# Patient Record
Sex: FEMALE | Race: BLACK OR AFRICAN AMERICAN | NOT HISPANIC OR LATINO | Employment: OTHER | URBAN - METROPOLITAN AREA
[De-identification: names, ages, dates, MRNs, and addresses within clinical notes are randomized per-mention and may not be internally consistent; named-entity substitution may affect disease eponyms.]

---

## 2023-12-18 ENCOUNTER — HOSPITAL ENCOUNTER (EMERGENCY)
Facility: HOSPITAL | Age: 78
Discharge: ANOTHER HOSPITAL | End: 2023-12-18
Attending: FAMILY MEDICINE

## 2023-12-18 VITALS
SYSTOLIC BLOOD PRESSURE: 145 MMHG | BODY MASS INDEX: 21.35 KG/M2 | TEMPERATURE: 98 F | HEIGHT: 62 IN | WEIGHT: 116 LBS | DIASTOLIC BLOOD PRESSURE: 88 MMHG | RESPIRATION RATE: 18 BRPM | HEART RATE: 96 BPM | OXYGEN SATURATION: 98 %

## 2023-12-18 DIAGNOSIS — R07.9 CHEST PAIN: ICD-10-CM

## 2023-12-18 DIAGNOSIS — I21.4 NSTEMI (NON-ST ELEVATED MYOCARDIAL INFARCTION): Primary | ICD-10-CM

## 2023-12-18 LAB
ALBUMIN SERPL-MCNC: 4 G/DL (ref 3.4–4.8)
ALBUMIN/GLOB SERPL: 0.9 RATIO (ref 1.1–2)
ALP SERPL-CCNC: 78 UNIT/L (ref 40–150)
ALT SERPL-CCNC: 17 UNIT/L (ref 0–55)
APPEARANCE UR: CLEAR
AST SERPL-CCNC: 31 UNIT/L (ref 5–34)
BACTERIA #/AREA URNS AUTO: ABNORMAL /HPF
BASOPHILS # BLD AUTO: 0.01 X10(3)/MCL
BASOPHILS NFR BLD AUTO: 0.1 %
BILIRUB SERPL-MCNC: 1.2 MG/DL
BILIRUB UR QL STRIP.AUTO: NEGATIVE
BUN SERPL-MCNC: 10.4 MG/DL (ref 9.8–20.1)
CALCIUM SERPL-MCNC: 9.3 MG/DL (ref 8.4–10.2)
CHLORIDE SERPL-SCNC: 104 MMOL/L (ref 98–107)
CO2 SERPL-SCNC: 20 MMOL/L (ref 23–31)
COLOR UR AUTO: YELLOW
CREAT SERPL-MCNC: 1.07 MG/DL (ref 0.55–1.02)
EOSINOPHIL # BLD AUTO: 0.02 X10(3)/MCL (ref 0–0.9)
EOSINOPHIL NFR BLD AUTO: 0.3 %
ERYTHROCYTE [DISTWIDTH] IN BLOOD BY AUTOMATED COUNT: 13.5 % (ref 11.5–17)
FLUAV AG UPPER RESP QL IA.RAPID: NOT DETECTED
FLUBV AG UPPER RESP QL IA.RAPID: NOT DETECTED
GFR SERPLBLD CREATININE-BSD FMLA CKD-EPI: 53 MLS/MIN/1.73/M2
GLOBULIN SER-MCNC: 4.5 GM/DL (ref 2.4–3.5)
GLUCOSE SERPL-MCNC: 157 MG/DL (ref 82–115)
GLUCOSE UR QL STRIP.AUTO: NEGATIVE
HCT VFR BLD AUTO: 45.3 % (ref 37–47)
HGB BLD-MCNC: 14.1 G/DL (ref 12–16)
IMM GRANULOCYTES # BLD AUTO: 0.01 X10(3)/MCL (ref 0–0.04)
IMM GRANULOCYTES NFR BLD AUTO: 0.1 %
KETONES UR QL STRIP.AUTO: NEGATIVE
LEUKOCYTE ESTERASE UR QL STRIP.AUTO: ABNORMAL
LYMPHOCYTES # BLD AUTO: 1.3 X10(3)/MCL (ref 0.6–4.6)
LYMPHOCYTES NFR BLD AUTO: 17.9 %
MCH RBC QN AUTO: 26.1 PG (ref 27–31)
MCHC RBC AUTO-ENTMCNC: 31.1 G/DL (ref 33–36)
MCV RBC AUTO: 83.7 FL (ref 80–94)
MONOCYTES # BLD AUTO: 0.67 X10(3)/MCL (ref 0.1–1.3)
MONOCYTES NFR BLD AUTO: 9.2 %
NEUTROPHILS # BLD AUTO: 5.25 X10(3)/MCL (ref 2.1–9.2)
NEUTROPHILS NFR BLD AUTO: 72.4 %
NITRITE UR QL STRIP.AUTO: NEGATIVE
PH UR STRIP.AUTO: 5.5 [PH]
PLATELET # BLD AUTO: 127 X10(3)/MCL (ref 130–400)
PMV BLD AUTO: 11.5 FL (ref 7.4–10.4)
POC CARDIAC TROPONIN I: 0.46 NG/ML (ref 0–0.08)
POC CARDIAC TROPONIN I: 0.84 NG/ML (ref 0–0.08)
POTASSIUM SERPL-SCNC: 3.5 MMOL/L (ref 3.5–5.1)
PROT SERPL-MCNC: 8.5 GM/DL (ref 5.8–7.6)
PROT UR QL STRIP.AUTO: 100
RBC # BLD AUTO: 5.41 X10(6)/MCL (ref 4.2–5.4)
RBC #/AREA URNS AUTO: ABNORMAL /HPF
RBC UR QL AUTO: ABNORMAL
RSV A 5' UTR RNA NPH QL NAA+PROBE: NOT DETECTED
SAMPLE: ABNORMAL
SAMPLE: ABNORMAL
SARS-COV-2 RNA RESP QL NAA+PROBE: NOT DETECTED
SODIUM SERPL-SCNC: 139 MMOL/L (ref 136–145)
SP GR UR STRIP.AUTO: 1.02 (ref 1–1.03)
SQUAMOUS #/AREA URNS AUTO: ABNORMAL /HPF
UROBILINOGEN UR STRIP-ACNC: 0.2
WBC # SPEC AUTO: 7.26 X10(3)/MCL (ref 4.5–11.5)
WBC #/AREA URNS AUTO: ABNORMAL /HPF

## 2023-12-18 PROCEDURE — 93005 ELECTROCARDIOGRAM TRACING: CPT

## 2023-12-18 PROCEDURE — 0241U COVID/RSV/FLU A&B PCR: CPT | Performed by: FAMILY MEDICINE

## 2023-12-18 PROCEDURE — 80053 COMPREHEN METABOLIC PANEL: CPT | Performed by: FAMILY MEDICINE

## 2023-12-18 PROCEDURE — 81001 URINALYSIS AUTO W/SCOPE: CPT | Performed by: FAMILY MEDICINE

## 2023-12-18 PROCEDURE — 96372 THER/PROPH/DIAG INJ SC/IM: CPT | Mod: 59 | Performed by: FAMILY MEDICINE

## 2023-12-18 PROCEDURE — 85025 COMPLETE CBC W/AUTO DIFF WBC: CPT | Performed by: FAMILY MEDICINE

## 2023-12-18 PROCEDURE — 94761 N-INVAS EAR/PLS OXIMETRY MLT: CPT

## 2023-12-18 PROCEDURE — 99285 EMERGENCY DEPT VISIT HI MDM: CPT | Mod: 25

## 2023-12-18 PROCEDURE — 25000242 PHARM REV CODE 250 ALT 637 W/ HCPCS: Performed by: FAMILY MEDICINE

## 2023-12-18 PROCEDURE — 94640 AIRWAY INHALATION TREATMENT: CPT

## 2023-12-18 PROCEDURE — 96375 TX/PRO/DX INJ NEW DRUG ADDON: CPT

## 2023-12-18 PROCEDURE — 63600175 PHARM REV CODE 636 W HCPCS: Performed by: FAMILY MEDICINE

## 2023-12-18 PROCEDURE — 25000003 PHARM REV CODE 250: Performed by: FAMILY MEDICINE

## 2023-12-18 RX ORDER — LABETALOL HCL 20 MG/4 ML
20 SYRINGE (ML) INTRAVENOUS EVERY 4 HOURS PRN
Status: DISCONTINUED | OUTPATIENT
Start: 2023-12-18 | End: 2023-12-18 | Stop reason: HOSPADM

## 2023-12-18 RX ORDER — IPRATROPIUM BROMIDE AND ALBUTEROL SULFATE 2.5; .5 MG/3ML; MG/3ML
3 SOLUTION RESPIRATORY (INHALATION)
Status: COMPLETED | OUTPATIENT
Start: 2023-12-18 | End: 2023-12-18

## 2023-12-18 RX ORDER — METOPROLOL TARTRATE 25 MG/1
25 TABLET, FILM COATED ORAL 2 TIMES DAILY
Status: DISCONTINUED | OUTPATIENT
Start: 2023-12-18 | End: 2023-12-18 | Stop reason: HOSPADM

## 2023-12-18 RX ORDER — FUROSEMIDE 10 MG/ML
40 INJECTION INTRAMUSCULAR; INTRAVENOUS
Status: COMPLETED | OUTPATIENT
Start: 2023-12-18 | End: 2023-12-18

## 2023-12-18 RX ORDER — ENOXAPARIN SODIUM 100 MG/ML
50 INJECTION SUBCUTANEOUS
Status: COMPLETED | OUTPATIENT
Start: 2023-12-18 | End: 2023-12-18

## 2023-12-18 RX ORDER — HYDRALAZINE HYDROCHLORIDE 20 MG/ML
20 INJECTION INTRAMUSCULAR; INTRAVENOUS EVERY 4 HOURS PRN
Status: DISCONTINUED | OUTPATIENT
Start: 2023-12-18 | End: 2023-12-18 | Stop reason: HOSPADM

## 2023-12-18 RX ADMIN — METHYLPREDNISOLONE SODIUM SUCCINATE 120 MG: 40 INJECTION, POWDER, FOR SOLUTION INTRAMUSCULAR; INTRAVENOUS at 11:12

## 2023-12-18 RX ADMIN — IPRATROPIUM BROMIDE AND ALBUTEROL SULFATE 3 ML: .5; 3 SOLUTION RESPIRATORY (INHALATION) at 11:12

## 2023-12-18 RX ADMIN — CLONIDINE HYDROCHLORIDE 0.3 MG: 0.2 TABLET ORAL at 11:12

## 2023-12-18 RX ADMIN — ENOXAPARIN SODIUM 50 MG: 60 INJECTION SUBCUTANEOUS at 02:12

## 2023-12-18 RX ADMIN — FUROSEMIDE 40 MG: 10 INJECTION, SOLUTION INTRAMUSCULAR; INTRAVENOUS at 11:12

## 2023-12-18 NOTE — ED PROVIDER NOTES
Encounter Date: 12/18/2023       History     Chief Complaint   Patient presents with    Cough     Cough with SOB today      Cough with shortness of breath   78-year-old female patient comes in with cough shortness of breath patient's blood pressure is elevated and she does not take any medicines otherwise patient has no nausea no vomiting no diarrhea no fever chills or night sweats no other complaints at this time patient and her caretaker of the history source        Review of patient's allergies indicates:  No Known Allergies  No past medical history on file.  No past surgical history on file.  No family history on file.     Review of Systems   Constitutional:  Negative for fever.   HENT:  Negative for sore throat.    Respiratory:  Positive for cough and shortness of breath.    Cardiovascular:  Negative for chest pain.   Gastrointestinal:  Negative for nausea.   Genitourinary:  Negative for dysuria.   Musculoskeletal:  Negative for back pain.   Skin:  Negative for rash.   Neurological:  Negative for weakness.   Hematological:  Does not bruise/bleed easily.   All other systems reviewed and are negative.      Physical Exam     Initial Vitals   BP Pulse Resp Temp SpO2   12/18/23 1053 12/18/23 1047 12/18/23 1047 12/18/23 1047 12/18/23 1047   (!) 190/116 (!) 122 20 98.4 °F (36.9 °C) 95 %      MAP       --                Physical Exam    Nursing note and vitals reviewed.  Constitutional: She appears well-developed.   HENT:   Head: Normocephalic and atraumatic.   Right Ear: External ear normal.   Left Ear: External ear normal.   Nose: Nose normal.   Mouth/Throat: Oropharynx is clear and moist. No oropharyngeal exudate.   Eyes: Conjunctivae and EOM are normal. Pupils are equal, round, and reactive to light. Right eye exhibits no discharge. Left eye exhibits no discharge.   Neck: Neck supple. No tracheal deviation present. No JVD present.   Normal range of motion.  Cardiovascular:  Normal rate, regular rhythm, normal heart  sounds and intact distal pulses.     Exam reveals no gallop and no friction rub.       No murmur heard.  Pulmonary/Chest: No stridor. She is in respiratory distress. She has wheezes. She has no rhonchi. She has no rales.   Abdominal: Abdomen is soft. Bowel sounds are normal. She exhibits no distension and no mass. There is no abdominal tenderness. There is no rebound and no guarding.   Musculoskeletal:         General: Normal range of motion.      Cervical back: Normal range of motion and neck supple.     Neurological: She is alert and oriented to person, place, and time. She has normal strength. No cranial nerve deficit.   Skin: Skin is warm and dry. No rash and no abscess noted. No erythema.   Psychiatric: She has a normal mood and affect. Her behavior is normal. Judgment and thought content normal.         ED Course   Procedures  Labs Reviewed   COMPREHENSIVE METABOLIC PANEL - Abnormal; Notable for the following components:       Result Value    Carbon Dioxide 20 (*)     Glucose Level 157 (*)     Creatinine 1.07 (*)     Protein Total 8.5 (*)     Globulin 4.5 (*)     Albumin/Globulin Ratio 0.9 (*)     All other components within normal limits   URINALYSIS, REFLEX TO URINE CULTURE - Abnormal; Notable for the following components:    Protein,  (*)     Blood, UA Moderate (*)     Leukocyte Esterase, UA Small (*)     All other components within normal limits   CBC WITH DIFFERENTIAL - Abnormal; Notable for the following components:    RBC 5.41 (*)     MCH 26.1 (*)     MCHC 31.1 (*)     Platelet 127 (*)     MPV 11.5 (*)     All other components within normal limits   TROPONIN ISTAT - Abnormal; Notable for the following components:    POC Cardiac Troponin I 0.46 (*)     All other components within normal limits   URINALYSIS, MICROSCOPIC - Abnormal; Notable for the following components:    RBC, UA 6-10 (*)     All other components within normal limits   TROPONIN ISTAT - Abnormal; Notable for the following components:     POC Cardiac Troponin I 0.84 (*)     All other components within normal limits   COVID/RSV/FLU A&B PCR - Normal    Narrative:     The Xpert Xpress SARS-CoV-2/FLU/RSV plus is a rapid, multiplexed real-time PCR test intended for the simultaneous qualitative detection and differentiation of SARS-CoV-2, Influenza A, Influenza B, and respiratory syncytial virus (RSV) viral RNA in either nasopharyngeal swab or nasal swab specimens.         CBC W/ AUTO DIFFERENTIAL    Narrative:     The following orders were created for panel order CBC auto differential.  Procedure                               Abnormality         Status                     ---------                               -----------         ------                     CBC with Differential[[id number]]       Abnormal            Final result                 Please view results for these tests on the individual orders.   POCT TROPONIN     EKG Readings: (Independently Interpreted)   Rhythm: Sinus Tachycardia. Heart Rate: 122. Ectopy: No Ectopy. Conduction: Normal. ST Segments: Normal ST Segments. T Waves: Normal. Clinical Impression: Normal Sinus Rhythm       Imaging Results              X-Ray Chest AP Portable (Final result)  Result time 12/18/23 11:54:52      Final result by Ian Rasmussen MD (12/18/23 11:54:52)                   Impression:      No acute chest disease is identified.      Electronically signed by: Ian Rasmussen  Date:    12/18/2023  Time:    11:54               Narrative:    EXAMINATION:  XR CHEST AP PORTABLE    CLINICAL HISTORY:  Chest Pain;, .    FINDINGS:  No alveolar consolidation, effusion, or pneumothorax is seen.   The thoracic aorta is normal  cardiac silhouette, central pulmonary vessels and mediastinum are normal in size and are grossly unremarkable.   visualized osseous structures are grossly unremarkable.                                       Medications   enoxaparin injection 50 mg (has no administration in time range)    furosemide injection 40 mg (40 mg Intravenous Given 12/18/23 1120)   methylPREDNISolone sodium succinate injection 120 mg (120 mg Intravenous Given 12/18/23 1120)   albuterol-ipratropium 2.5 mg-0.5 mg/3 mL nebulizer solution 3 mL (3 mLs Nebulization Given 12/18/23 1122)   cloNIDine tablet 0.3 mg (0.3 mg Oral Given 12/18/23 1156)     Medical Decision Making  MI NSTEMI angina tachycardia hypertension    Amount and/or Complexity of Data Reviewed  Labs: ordered.  Radiology: ordered.    Risk  Prescription drug management.                                      Clinical Impression:  Final diagnoses:  [R07.9] Chest pain  [I21.4] NSTEMI (non-ST elevated myocardial infarction) (Primary)          ED Disposition Condition    Transfer to Another Facility Stable                Gera Jimenez MD  12/18/23 1340       Gera Jimenez MD  12/18/23 1624

## 2023-12-18 NOTE — ED NOTES
Dakota Branch with transfer center; pt accepted by Dr. Schreiber at [facility] - going ED to ED. She is setting up transport with [facility] now.

## 2023-12-18 NOTE — CONSULTS
[facility]  Cardiology  Consult Note    Patient Name: Jennifer Arriola  MRN: [id number]  Admission Date: 12/18/2023  Hospital Length of Stay: 0 days  Code Status: No Order   Attending Provider: Gera Jimenez MD   Consulting Provider: Edd Weston NP  Primary Care Physician: No primary care provider on file.  Principal Problem:<principal problem not specified>    Patient information was obtained from patient, ER records, and primary team.     Consults  Subjective:     Chief Complaint:    Telecardiology Consult  Location: [facility] ER  Provider: Dr Jimenez  Reason: NSTEMI  Duration: > 30 minutes including review of medical records, provider and patient interview    HPI:   79 yo female with history of remote HTN - quit meds a few years ago.    She presents to the emergency room today after she woke up this morning and had acute onset of shortness of breath.  She states she could not catch her breath and she had a feeling of shortness of breath up to her neck.  She presented to the ER for an evaluation.  Upon arrival blood pressure done was close to 200 systolic.  She had an EKG done that showed sinus tachycardia without any acute ST segment changes or elevation.  Her initial troponin was 0.46 a 2 hour repeat was 0.84.  Currently her blood pressure is much better controlled after some clonidine and Lasix.    Upon interview she states she has gotten significantly short of breath when she walks to her mailbox to check her mail.  She does have 2 sisters with a history of coronary artery disease.    She denies any overt chest discomfort.    SBP close to 200 on admit  EKG, ST, no acute changes, No JERI  Trop initial was 0.46, then bumped to 0.84    No past medical history on file.    No past surgical history on file.    Review of patient's allergies indicates:  No Known Allergies    No current facility-administered medications on file prior to encounter.     No current outpatient medications on file  prior to encounter.     Family History    None       Tobacco Use    Smoking status: Not on file    Smokeless tobacco: Not on file   Substance and Sexual Activity    Alcohol use: Not on file    Drug use: Not on file    Sexual activity: Not on file     Review of Systems   Constitutional: Negative.   HENT: Negative.     Eyes: Negative.    Cardiovascular:  Positive for dyspnea on exertion. Negative for chest pain, near-syncope, palpitations and syncope.   Respiratory:  Positive for cough and shortness of breath. Negative for wheezing.    Skin: Negative.    Musculoskeletal: Negative.    Gastrointestinal: Negative.    Neurological: Negative.    Psychiatric/Behavioral: Negative.       Objective:     Vital Signs (Most Recent):  Temp: 98.4 °F (36.9 °C) (12/18/23 1047)  Pulse: 105 (12/18/23 1345)  Resp: 18 (12/18/23 1345)  BP: 129/88 (12/18/23 1345)  SpO2: 98 % (12/18/23 1345) Vital Signs (24h Range):  Temp:  [98.4 °F (36.9 °C)] 98.4 °F (36.9 °C)  Pulse:  [105-122] 105  Resp:  [18-22] 18  SpO2:  [94 %-98 %] 98 %  BP: (112-196)/() 129/88     Weight: 52.6 kg (116 lb)  Body mass index is 21.22 kg/m².    SpO2: 98 %         Intake/Output Summary (Last 24 hours) at 12/18/2023 1414  Last data filed at 12/18/2023 1330  Gross per 24 hour   Intake --   Output 250 ml   Net -250 ml       Lines/Drains/Airways       Peripheral Intravenous Line  Duration                  Peripheral IV - Single Lumen 12/18/23 1114 20 G Right Antecubital <1 day                    Physical Exam  HENT:      Head: Normocephalic.      Nose: Nose normal.      Mouth/Throat:      Mouth: Mucous membranes are moist.   Eyes:      Extraocular Movements: Extraocular movements intact.   Cardiovascular:      Rate and Rhythm: Normal rate and regular rhythm.   Pulmonary:      Effort: Pulmonary effort is normal.      Breath sounds: Normal breath sounds.   Abdominal:      General: Bowel sounds are normal.   Musculoskeletal:      Cervical back: Normal range of motion.    Skin:     General: Skin is warm and dry.   Neurological:      General: No focal deficit present.      Mental Status: She is alert.   Psychiatric:         Mood and Affect: Mood normal.         Significant Labs:   Recent Lab Results  (Last 5 results in the past 24 hours)        12/18/23  1322   12/18/23  1155   12/18/23  1132   12/18/23  1128   12/18/23  1124        RSV Ag by Molecular Method     Not Detected           Influenza A, Molecular     Not Detected           Influenza B, Molecular     Not Detected           Albumin/Globulin Ratio       0.9         Albumin       4.0         ALP       78         ALT       17         Appearance, UA   Clear             AST       31         Bacteria, UA   None Seen             Baso #       0.01         Basophil %       0.1         BILIRUBIN TOTAL       1.2         Bilirubin, UA   Negative             BUN       10.4         Calcium       9.3         Chloride       104         CO2       20         Color, UA   Yellow             Creatinine       1.07         eGFR       53         Eos #       0.02         Eosinophil %       0.3         Globulin, Total       4.5         Glucose       157         Glucose, UA   Negative             Hematocrit       45.3         Hemoglobin       14.1         Immature Grans (Abs)       0.01         Immature Granulocytes       0.1         Ketones, UA   Negative             Leukocytes, UA   Small             Lymph #       1.30         LYMPH %       17.9         MCH       26.1         MCHC       31.1         MCV       83.7         Mono #       0.67         Mono %       9.2         MPV       11.5         Neut #       5.25         Neut %       72.4         NITRITE UA   Negative             Occult Blood UA   Moderate             pH, UA   5.5             Platelet Count       127         POC Cardiac Troponin I 0.84         0.46       Potassium       3.5         PROTEIN TOTAL       8.5         Protein, UA   100             RBC       5.41         RBC, UA   6-10              RDW       13.5         Sample unknown  Comment: A single negative troponin is insufficient to rule out myocardial infarction.  The use of a serial sampling protocol is recommended practice. Correlate results with reference intervals established for methodology used. Point of care and core laboratory   troponin results are not interchangeable.           unknown  Comment: A single negative troponin is insufficient to rule out myocardial infarction.  The use of a serial sampling protocol is recommended practice. Correlate results with reference intervals established for methodology used. Point of care and core laboratory   troponin results are not interchangeable.         SARS-CoV2 (COVID-19) Qualitative PCR     Not Detected           Sodium       139         Specific Gravity,UA   1.020             Squam Epithel, UA   None Seen             Urobilinogen, UA   0.2             WBC, UA   0-5             WBC       7.26                                Significant Imaging:   EKG:      CXR:    Assessment and Plan:   Impression:  Acute onset shortness of breath   -concerning for angina  Abnormal cardiac biomarkers, upward trending  Hypertension with a systolic blood pressure close to 200  Family history of coronary artery disease    Plan:  I feel with her acute onset of shortness of breath, positive family history of coronary artery disease and up trending troponin she should be transferred to a facility where more invasive testing can be performed.  She will need echocardiogram and possibly a stress test versus angiogram for ischemic evaluation.    Continue aspirin   Lovenox mg per kg subQ b.i.d.  Metoprolol 25 b.i.d.   Nitropaste 1 in to chest wall q.6  P.r.n. labetalol hydralazine  NPO after 2:30 a.m. except for meds    Thank you for the consultation should you have any further questions or concerns please do not hesitate to call    There are no hospital problems to display for this patient.      VTE Risk Mitigation  (From admission, onward)      None            Thank you for your consult.     Edd Weston NP  Cardiology   [facility] - Emergency Dept